# Patient Record
Sex: MALE | Race: WHITE | NOT HISPANIC OR LATINO | Employment: FULL TIME | URBAN - METROPOLITAN AREA
[De-identification: names, ages, dates, MRNs, and addresses within clinical notes are randomized per-mention and may not be internally consistent; named-entity substitution may affect disease eponyms.]

---

## 2019-02-06 ENCOUNTER — TRANSCRIBE ORDERS (OUTPATIENT)
Dept: ADMINISTRATIVE | Age: 54
End: 2019-02-06

## 2019-02-06 ENCOUNTER — APPOINTMENT (OUTPATIENT)
Dept: LAB | Age: 54
End: 2019-02-06
Payer: COMMERCIAL

## 2019-02-06 DIAGNOSIS — E66.01 MORBID OBESITY (HCC): Primary | ICD-10-CM

## 2019-02-06 DIAGNOSIS — Z11.59 SCREENING EXAMINATION FOR POLIOMYELITIS: ICD-10-CM

## 2019-02-06 DIAGNOSIS — E66.01 MORBID OBESITY (HCC): ICD-10-CM

## 2019-02-06 DIAGNOSIS — R35.1 NOCTURIA: ICD-10-CM

## 2019-02-06 DIAGNOSIS — E78.2 MIXED HYPERLIPIDEMIA: ICD-10-CM

## 2019-02-06 DIAGNOSIS — R73.01 IMPAIRED FASTING GLUCOSE: ICD-10-CM

## 2019-02-06 LAB
ALBUMIN SERPL BCP-MCNC: 4 G/DL (ref 3.5–5)
ALP SERPL-CCNC: 47 U/L (ref 46–116)
ALT SERPL W P-5'-P-CCNC: 80 U/L (ref 12–78)
ANION GAP SERPL CALCULATED.3IONS-SCNC: 6 MMOL/L (ref 4–13)
AST SERPL W P-5'-P-CCNC: 36 U/L (ref 5–45)
BILIRUB SERPL-MCNC: 0.79 MG/DL (ref 0.2–1)
BILIRUB UR QL STRIP: NEGATIVE
BUN SERPL-MCNC: 16 MG/DL (ref 5–25)
CALCIUM SERPL-MCNC: 9.2 MG/DL (ref 8.3–10.1)
CHLORIDE SERPL-SCNC: 105 MMOL/L (ref 100–108)
CHOLEST SERPL-MCNC: 234 MG/DL (ref 50–200)
CLARITY UR: NORMAL
CO2 SERPL-SCNC: 27 MMOL/L (ref 21–32)
COLOR UR: YELLOW
CREAT SERPL-MCNC: 0.8 MG/DL (ref 0.6–1.3)
EST. AVERAGE GLUCOSE BLD GHB EST-MCNC: 120 MG/DL
GFR SERPL CREATININE-BSD FRML MDRD: 101 ML/MIN/1.73SQ M
GLUCOSE P FAST SERPL-MCNC: 119 MG/DL (ref 65–99)
GLUCOSE UR STRIP-MCNC: NEGATIVE MG/DL
HBA1C MFR BLD: 5.8 % (ref 4.2–6.3)
HCV AB SER QL: NORMAL
HDLC SERPL-MCNC: 34 MG/DL (ref 40–60)
HGB UR QL STRIP.AUTO: NEGATIVE
KETONES UR STRIP-MCNC: NEGATIVE MG/DL
LDLC SERPL CALC-MCNC: 170 MG/DL (ref 0–100)
LEUKOCYTE ESTERASE UR QL STRIP: NEGATIVE
NITRITE UR QL STRIP: NEGATIVE
NONHDLC SERPL-MCNC: 200 MG/DL
PH UR STRIP.AUTO: 5.5 [PH] (ref 4.5–8)
POTASSIUM SERPL-SCNC: 4.1 MMOL/L (ref 3.5–5.3)
PROT SERPL-MCNC: 7.4 G/DL (ref 6.4–8.2)
PROT UR STRIP-MCNC: NEGATIVE MG/DL
PSA SERPL-MCNC: 0.6 NG/ML (ref 0–4)
SODIUM SERPL-SCNC: 138 MMOL/L (ref 136–145)
SP GR UR STRIP.AUTO: 1.03 (ref 1–1.03)
TRIGL SERPL-MCNC: 149 MG/DL
UROBILINOGEN UR QL STRIP.AUTO: 0.2 E.U./DL

## 2019-02-06 PROCEDURE — 36415 COLL VENOUS BLD VENIPUNCTURE: CPT

## 2019-02-06 PROCEDURE — G0103 PSA SCREENING: HCPCS

## 2019-02-06 PROCEDURE — 86803 HEPATITIS C AB TEST: CPT

## 2019-02-06 PROCEDURE — 80061 LIPID PANEL: CPT

## 2019-02-06 PROCEDURE — 80053 COMPREHEN METABOLIC PANEL: CPT

## 2019-02-06 PROCEDURE — 83036 HEMOGLOBIN GLYCOSYLATED A1C: CPT

## 2019-02-06 PROCEDURE — 81003 URINALYSIS AUTO W/O SCOPE: CPT | Performed by: INTERNAL MEDICINE

## 2019-08-22 ENCOUNTER — HOSPITAL ENCOUNTER (OUTPATIENT)
Dept: RADIOLOGY | Age: 54
Discharge: HOME | End: 2019-08-22
Payer: OTHER MISCELLANEOUS

## 2019-08-22 DIAGNOSIS — M75.100 ROTATOR CUFF TEAR: ICD-10-CM

## 2019-08-22 PROCEDURE — 73221 MRI JOINT UPR EXTREM W/O DYE: CPT

## 2019-10-30 ENCOUNTER — HOSPITAL ENCOUNTER (OUTPATIENT)
Dept: RADIOLOGY | Age: 54
Discharge: HOME | End: 2019-10-30
Payer: COMMERCIAL

## 2019-10-30 DIAGNOSIS — M77.9 TENDONITIS: ICD-10-CM

## 2019-10-30 PROCEDURE — 73721 MRI JNT OF LWR EXTRE W/O DYE: CPT

## 2020-02-25 ENCOUNTER — TRANSCRIBE ORDERS (OUTPATIENT)
Dept: ADMINISTRATIVE | Age: 55
End: 2020-02-25

## 2020-02-25 ENCOUNTER — APPOINTMENT (OUTPATIENT)
Dept: LAB | Age: 55
End: 2020-02-25
Payer: COMMERCIAL

## 2020-02-25 DIAGNOSIS — R73.03 DIABETES MELLITUS, LATENT: Primary | ICD-10-CM

## 2020-02-25 DIAGNOSIS — Z79.1 ENCOUNTER FOR LONG-TERM (CURRENT) USE OF NON-STEROIDAL ANTI-INFLAMMATORIES: ICD-10-CM

## 2020-02-25 DIAGNOSIS — E66.8 CONSTITUTIONAL OBESITY: ICD-10-CM

## 2020-02-25 DIAGNOSIS — R73.03 DIABETES MELLITUS, LATENT: ICD-10-CM

## 2020-02-25 LAB
ALBUMIN SERPL BCP-MCNC: 4 G/DL (ref 3.5–5)
ALP SERPL-CCNC: 45 U/L (ref 46–116)
ALT SERPL W P-5'-P-CCNC: 72 U/L (ref 12–78)
ANION GAP SERPL CALCULATED.3IONS-SCNC: 5 MMOL/L (ref 4–13)
AST SERPL W P-5'-P-CCNC: 34 U/L (ref 5–45)
BASOPHILS # BLD AUTO: 0.03 THOUSANDS/ΜL (ref 0–0.1)
BASOPHILS NFR BLD AUTO: 1 % (ref 0–1)
BILIRUB SERPL-MCNC: 0.67 MG/DL (ref 0.2–1)
BUN SERPL-MCNC: 14 MG/DL (ref 5–25)
CALCIUM SERPL-MCNC: 9.2 MG/DL (ref 8.3–10.1)
CHLORIDE SERPL-SCNC: 109 MMOL/L (ref 100–108)
CO2 SERPL-SCNC: 27 MMOL/L (ref 21–32)
CREAT SERPL-MCNC: 0.85 MG/DL (ref 0.6–1.3)
EOSINOPHIL # BLD AUTO: 0.21 THOUSAND/ΜL (ref 0–0.61)
EOSINOPHIL NFR BLD AUTO: 4 % (ref 0–6)
ERYTHROCYTE [DISTWIDTH] IN BLOOD BY AUTOMATED COUNT: 12.3 % (ref 11.6–15.1)
EST. AVERAGE GLUCOSE BLD GHB EST-MCNC: 120 MG/DL
GFR SERPL CREATININE-BSD FRML MDRD: 98 ML/MIN/1.73SQ M
GLUCOSE P FAST SERPL-MCNC: 132 MG/DL (ref 65–99)
HBA1C MFR BLD: 5.8 %
HCT VFR BLD AUTO: 46.2 % (ref 36.5–49.3)
HGB BLD-MCNC: 15.7 G/DL (ref 12–17)
IMM GRANULOCYTES # BLD AUTO: 0.01 THOUSAND/UL (ref 0–0.2)
IMM GRANULOCYTES NFR BLD AUTO: 0 % (ref 0–2)
LYMPHOCYTES # BLD AUTO: 1.94 THOUSANDS/ΜL (ref 0.6–4.47)
LYMPHOCYTES NFR BLD AUTO: 34 % (ref 14–44)
MCH RBC QN AUTO: 30.6 PG (ref 26.8–34.3)
MCHC RBC AUTO-ENTMCNC: 34 G/DL (ref 31.4–37.4)
MCV RBC AUTO: 90 FL (ref 82–98)
MONOCYTES # BLD AUTO: 0.49 THOUSAND/ΜL (ref 0.17–1.22)
MONOCYTES NFR BLD AUTO: 9 % (ref 4–12)
NEUTROPHILS # BLD AUTO: 2.98 THOUSANDS/ΜL (ref 1.85–7.62)
NEUTS SEG NFR BLD AUTO: 52 % (ref 43–75)
NRBC BLD AUTO-RTO: 0 /100 WBCS
PLATELET # BLD AUTO: 179 THOUSANDS/UL (ref 149–390)
PMV BLD AUTO: 11.1 FL (ref 8.9–12.7)
POTASSIUM SERPL-SCNC: 4.1 MMOL/L (ref 3.5–5.3)
PROT SERPL-MCNC: 7 G/DL (ref 6.4–8.2)
RBC # BLD AUTO: 5.13 MILLION/UL (ref 3.88–5.62)
SODIUM SERPL-SCNC: 141 MMOL/L (ref 136–145)
WBC # BLD AUTO: 5.66 THOUSAND/UL (ref 4.31–10.16)

## 2020-02-25 PROCEDURE — 36415 COLL VENOUS BLD VENIPUNCTURE: CPT

## 2020-02-25 PROCEDURE — 80053 COMPREHEN METABOLIC PANEL: CPT

## 2020-02-25 PROCEDURE — 83036 HEMOGLOBIN GLYCOSYLATED A1C: CPT

## 2020-02-25 PROCEDURE — 85025 COMPLETE CBC W/AUTO DIFF WBC: CPT

## 2020-12-09 DIAGNOSIS — Z20.828 EXPOSURE TO SARS-ASSOCIATED CORONAVIRUS: ICD-10-CM

## 2020-12-09 PROCEDURE — U0003 INFECTIOUS AGENT DETECTION BY NUCLEIC ACID (DNA OR RNA); SEVERE ACUTE RESPIRATORY SYNDROME CORONAVIRUS 2 (SARS-COV-2) (CORONAVIRUS DISEASE [COVID-19]), AMPLIFIED PROBE TECHNIQUE, MAKING USE OF HIGH THROUGHPUT TECHNOLOGIES AS DESCRIBED BY CMS-2020-01-R: HCPCS | Performed by: INTERNAL MEDICINE

## 2020-12-10 LAB — SARS-COV-2 RNA SPEC QL NAA+PROBE: NOT DETECTED

## 2022-04-19 ENCOUNTER — EVALUATION (OUTPATIENT)
Dept: OCCUPATIONAL THERAPY | Age: 57
End: 2022-04-19
Payer: OTHER MISCELLANEOUS

## 2022-04-19 DIAGNOSIS — G56.21 LESION OF RIGHT ULNAR NERVE: Primary | ICD-10-CM

## 2022-04-19 PROCEDURE — 97165 OT EVAL LOW COMPLEX 30 MIN: CPT

## 2022-04-19 PROCEDURE — 97110 THERAPEUTIC EXERCISES: CPT

## 2022-04-19 NOTE — PROGRESS NOTES
OT Evaluation     Today's date: 2022  Patient name: Aleks Herrera  : 1965  MRN: [id number]  Referring provider: Yuliet hSarp MD  Dx:   Encounter Diagnosis     ICD-10-CM    1  Lesion of right ulnar nerve  G56 21                   Assessment  Assessment details: Pt is a 61year old male who presents to skilled OT tx s/p subcutaneous ulnar nerve transposition 22  Pt reported mild pain and demo mild edema and ROM limitation to RUE  Pt demo F+ ROM of elbow- noted in flexed position of -15 degrees, but pt can fully extend to zero  Pt noted with stiffness of wrist, 0-45 degrees flex/ext  Pt c/o localized numbness at/around olecranon; no other n/t   Pt would benefit from skilled OT tx to inc ROM, strength, and functional use of RUE  Impairments: abnormal coordination, abnormal or restricted ROM, activity intolerance, impaired physical strength, lacks appropriate home exercise program and pain with function    Symptom irritability: lowBarriers to therapy: Pt travels for work; will be out of the country for the next 2-3 weeks  Understanding of Dx/Px/POC: good   Prognosis: good    Goals  STGs:  1  Pt will report no pain of RUE  2  Pt will inc ROM of wrist to WFLs  3  Pt will be independent with HEP  LTGs  1  Pt will inc  and MMT strength to [facility]  2  Pt will be independent with scar massage  3  Pt will inc FOTO score  2  Pt will report     Plan  Patient would benefit from: skilled occupational therapy and OT eval  Planned modality interventions: thermotherapy: hydrocollator packs, cryotherapy, TENS and unattended electrical stimulation  Planned therapy interventions: joint mobilization, manual therapy, massage, neuromuscular re-education, patient education, self care, sensory integrative techniques, strengthening, stretching, therapeutic activities, therapeutic exercise, home exercise program and functional ROM exercises  Frequency: 1-2x per month    Duration in visits: 4  Plan of Care beginning date: 2022  Treatment plan discussed with: patient        Subjective Evaluation    History of Present Illness  Date of surgery: 2022  Mechanism of injury: surgery and trauma  Mechanism of injury: Pt is a 61year old male who presents to skilled OT tx s/p subcutaneous ulnar nerve transposition 22          Recurrent probem    Quality of life: good    Pain  Current pain ratin  At best pain ratin  At worst pain ratin  Location: elbow/scar  Quality: burning, radiating and throbbing  Relieving factors: rest, support and change in position  Progression: improved    Treatments  Current treatment: occupational therapy  Patient Goals  Patient goals for therapy: decreased edema, decreased pain, increased motion and increased strength          Objective     Observations     Additional Observation Details   Steri strips still intact on medial side of FA and elbow    Tenderness     Right Elbow   Tenderness in the medial epicondyle, olecranon process and proximal radioulnar joint  Right Wrist/Hand   Tenderness in the medial epicondyle, olecranon process and proximal radioulnar joint  Neurological Testing     Sensation     Wrist/Hand     Right   Intact: light touch, pin prick and sharp/dull discrimination    Additional Neurological Details  SW-3 61+    Active Range of Motion     Right Elbow   Flexion: 130 degrees   Extension: 0 degrees   Forearm supination: 45 degrees   Forearm pronation: 80 degrees     Right Wrist   Wrist flexion: 45 degrees   Wrist extension: 45 degrees   Radial deviation: 20 degrees   Ulnar deviation: 30 degrees     Additional Active Range of Motion Details  Pt has h/o ruptured long head of biceps; difficulty with supination may also be contributed       Strength/Myotome Testing     Left Elbow   Flexion: 5  Extension: 5  Forearm supination: 5  Forearm pronation: 5    Right Elbow   Flexion: 3+  Extension: 3+  Forearm supination: 3-  Forearm pronation: 3    Left Wrist/Hand   Wrist extension: 5  Wrist flexion: 5  Radial deviation: 5  Ulnar deviation: 5    Right Wrist/Hand   Wrist extension: 3+  Wrist flexion: 3+  Radial deviation: 3+  Ulnar deviation: 3+    Tests     Right Elbow   Negative Tinel's sign (cubital tunnel)  Right Wrist/Hand   Negative Phalen's sign and Tinel's sign (medial nerve)       Swelling   Left Elbow Girth Measurements   Joint line: 33 cm    Right Elbow Girth Measurements   Joint line: 37 cm    Left Wrist/Hand   Circumference MCP: 23 cm  Circumference wrist: 20 cm    Right Wrist/Hand   Circumference MCP: 24 cm  Circumference wrist: 21 cm             Precautions: Universal;  HEP- scar massage, A/AROM elbow, FA, wrist, isometric strengthening of elbow and wrist       Manuals 4/19            Edema/scar mgmt             PROM                          KT             Neuro Re-Ed             HEP review             Desensitization                                                                              Ther Ex             Elbow ROM             Wrist ROM:  We/wf             Digit ROM:   KUMAR  TGE             Isometric:  Elbow flex/ext  Wrist flex/exy                                                                 Ther Activity                                       Gait Training                                       Modalities

## 2022-08-16 ENCOUNTER — EVALUATION (OUTPATIENT)
Dept: OCCUPATIONAL THERAPY | Age: 57
End: 2022-08-16
Payer: COMMERCIAL

## 2022-08-16 DIAGNOSIS — G56.21 LESION OF RIGHT ULNAR NERVE: Primary | ICD-10-CM

## 2022-08-16 PROCEDURE — 97167 OT EVAL HIGH COMPLEX 60 MIN: CPT

## 2022-08-16 NOTE — LETTER
2022    Amaya Brar, [address] [address]    Patient: Tammie Pope   YOB: 1965   Date of Visit: 2022     Encounter Diagnosis     ICD-10-CM    1  Lesion of right ulnar nerve  G56 21        Dear Dr Perez New: Thank you for your recent referral of Tammie Pope  Please review the attached evaluation summary from Mayo's recent visit  Please verify that you agree with the plan of care by signing the attached order  If you have any questions or concerns, please do not hesitate to call  I sincerely appreciate the opportunity to share in the care of one of your patients and hope to have another opportunity to work with you in the near future  Sincerely,    Naomi Griffin, OT      Referring Provider:     I certify that I have read the below Plan of Care and certify the need for these services furnished under this plan of treatment while under my care  Amaya Brar MD  [address] [place] [address]  [facility] [place]        OT Evaluation     Today's date: 2022  Patient name: Tammie Pope  : 1965  MRN: [id number]  Referring provider: Juliocesar Baron MD  Dx:   Encounter Diagnosis     ICD-10-CM    1  Lesion of right ulnar nerve  G56 21                   Assessment  Assessment details: Pt is a 60 y/o male who underwent a subcutaneous ulnar nerve transposition 22  Pt presented to skilled OT tx 22 for dec ROM of elbow, scar/edema mgmt, and dec strength  Pt then went out of the country for several weeks for work and then tested positive for [place]  Pt reports being very sick for approx 1 month; and then continuing to feel inc weakness and dec FMC of R hand  Pt reports developing shakes/tremors to R hand  Pt reports inability to use R hand for work or ADL tasks at home 2* tremors and weakness   Pt noted with weakness of FDP/FDS, weakness of Palmar and dorsal interossei, weakness of extension and flexion of wrist (flexion >extension), and FPL of thumb  AIN OK sign mostly intact, weakness and inc IP flexion with Froments sign; no clawing noted but pt reports on occ he sees his SF bent in or clawed but able to fix himself; negative Spulding test  Pt possible display ulnar and median nerve involvement; also has neurology appt for tremors  Impairments: abnormal coordination, abnormal or restricted ROM, activity intolerance, impaired physical strength, lacks appropriate home exercise program and pain with function    Symptom irritability: moderateBarriers to therapy: Work schedule   Understanding of Dx/Px/POC: good   Prognosis: good    Goals  STGs:  1  Pt will report dec pain by 50%  2  Pt will report dec tremors by 25%  3  Pt will inc R  strength + 5 #  4  Pt will inc wrist/digit strength by 1/2 muscle grade  LTGs:  1  Pt will be independent with HEP  2  Pt will inc [facility] by evidence of 5 sec faster 9 hole peg test  3  Pt will report dec tremors by 50%  4  Pt will use RUE in work and ADL tasls  5  Pt will inc FOTO score    Plan  Patient would benefit from: skilled occupational therapy and custom splinting  Planned modality interventions: thermotherapy: hydrocollator packs, electrical stimulation/Russian stimulation, fluidotherapy, ultrasound and unattended electrical stimulation  Planned therapy interventions: manual therapy, ADL training, neuromuscular re-education, orthotic fitting/training, orthotic management and training, patient education, strengthening, stretching, therapeutic exercise, therapeutic training, functional ROM exercises and home exercise program  Frequency: 1-2x per month  Duration in weeks: 8  Treatment plan discussed with: patient        Subjective Evaluation    History of Present Illness  Date of onset: 4/8/2022  Mechanism of injury: surgery and trauma  Mechanism of injury: Pt is a 62 y/o male who underwent a subcutaneous ulnar nerve transposition 4/8/22   Pt presented to skilled OT tx 4/22/22 for dec ROM of elbow, scar/edema mgmt, and dec strength  Pt then went out of the country for several weeks for work and then [place] positive [place]  Pt reports being very sick for approx 1 month; and then continuing to feel inc weakness and dec FMC of R hand  Pt reports developing shakes/tremors to R hand  Pt has f/u with neurology in Oct as well  Not a recurrent problem   Quality of life: poor    Pain  Current pain ratin  At best pain ratin  At worst pain ratin  Location: R elbow and hand  Quality: burning, needle-like and cramping  Relieving factors: rest and support  Progression: worsening    Treatments  Current treatment: occupational therapy  Patient Goals  Patient goals for therapy: increased strength, decreased pain and independence with ADLs/IADLs          Objective     Observations     Right Wrist/Hand   Positive for edema       Additional Observation Details  Mild edema to MPs compared to contralateral side    Neurological Testing     Sensation     Wrist/Hand     Right   Intact: light touch, pin prick and sharp/dull discrimination    Active Range of Motion     Left Wrist   Normal active range of motion    Right Wrist   Normal active range of motion  Wrist flexion: 60 degrees   Wrist extension: 50 degrees     Right Thumb   Flexion     MP: 45    DIP: 40  Palmar Abduction    CMC: 60  Radial Abduction    CMC: 50  Opposition: KJP 10    Right Digits   Flexion   Index     MCP: 70    PIP: 80    DIP: 45  Middle     MCP: 75    PIP: 80    DIP: 45  Ring     MCP: 75    PIP: 75    DIP: 45  Little     MCP: 75    PIP: 80    DIP: 45  Extension   Index     MCP: 0    PIP: 0    DIP: 0  Middle     MCP: 0    PIP: 0    DIP: 0  Ring     MCP: 0    PIP: 0    DIP: 0  Little     MCP: 0    PIP: 0    DIP: 0    Strength/Myotome Testing     Left Wrist/Hand   Wrist extension: 5  Wrist flexion: 5  Radial deviation: 5  Ulnar deviation: 5     (2nd hand position)     Trial 1: 96    Thumb Strength  Key/Lateral Pinch     Trial 1: 30  Tip/Two-Point Pinch     Trial 1: 15  Palmar/Three-Point Pinch     Trial 1: 20    Right Wrist/Hand   Wrist extension: 4  Wrist flexion: 4  Radial deviation: 4  Ulnar deviation: 4     (2nd hand position)     Trial 1: 73 4    Thumb Strength   Key/Lateral Pinch     Trial 1: 20  Tip/Two-Point Pinch     Trial 1: 12  Palmar/Three-Point Pinch     Trial 1: 16    Tests     Right Elbow   Negative Tinel's sign (cubital tunnel)  Right Wrist/Hand   Positive Froment's sign and Phalen's sign  Negative AIN OK sign, Finkelstein's, Tinel's sign (medial nerve) and Tinel's sign (radial tunnel)  Additional Tests Details  Pt noted with weakness of FDP/FDS, Palmar and dorsal interossei, extension and flexion of wrist, and FPL of thumb  AIN OK sign mostly intact, weakness and inc IP flexion with Froments sign; no clawing noted but pt reports on occ he sees his SF bent in or clawed but able to fix himself  Neuro Exam:     Functional outcomes   Left 9 peg hole test: 20 (seconds)  Right 9 hole peg test: 31 (seconds)             Precautions: Universal; AIN involvement/ ulnar and median motor involement  HEP- FDP/FDS isometrics, putty , putty pinch, foam squeeze for intrinsic strengthening        Manuals             UNGs/MNGs             PROM digits                          KT             Neuro Re-Ed             Orthotic mgmt             Fist and holds             Isometrics FDP/FDS                                                                 Ther Ex             Putty  and pinch             Digit ROM             ABD/ADD foam squeeze/strengthening                                                                               Ther Activity             FMC; opposition; translation                          Gait Training                                       Modalities

## 2022-08-17 NOTE — PROGRESS NOTES
OT Evaluation     Today's date: 2022  Patient name: Lizzy Acosta  : 1965  MRN: [id number]  Referring provider: Jo Ann Vines MD  Dx:   Encounter Diagnosis     ICD-10-CM    1  Lesion of right ulnar nerve  G56 21                   Assessment  Assessment details: Pt is a 60 y/o male who underwent a subcutaneous ulnar nerve transposition 22  Pt presented to skilled OT tx 22 for dec ROM of elbow, scar/edema mgmt, and dec strength  Pt then went out of the country for several weeks for work and then tested positive for [place]  Pt reports being very sick for approx 1 month; and then continuing to feel inc weakness and dec FMC of R hand  Pt reports developing shakes/tremors to R hand  Pt reports inability to use R hand for work or ADL tasks at home 2* tremors and weakness  Pt noted with weakness of FDP/FDS, weakness of Palmar and dorsal interossei, weakness of extension and flexion of wrist (flexion >extension), and FPL of thumb  AIN OK sign mostly intact, weakness and inc IP flexion with Froments sign; no clawing noted but pt reports on occ he sees his SF bent in or clawed but able to fix himself; negative Spulding test  Pt possible display ulnar and median nerve involvement; also has neurology appt for tremors  Impairments: abnormal coordination, abnormal or restricted ROM, activity intolerance, impaired physical strength, lacks appropriate home exercise program and pain with function    Symptom irritability: moderateBarriers to therapy: Work schedule   Understanding of Dx/Px/POC: good   Prognosis: good    Goals  STGs:  1  Pt will report dec pain by 50%  2  Pt will report dec tremors by 25%  3  Pt will inc R  strength + 5 #  4  Pt will inc wrist/digit strength by 1/2 muscle grade  LTGs:  1  Pt will be independent with HEP  2  Pt will inc [facility] by evidence of 5 sec faster 9 hole peg test  3  Pt will report dec tremors by 50%  4  Pt will use RUE in work and ADL tasls  5   Pt will inc FOTO score    Plan  Patient would benefit from: skilled occupational therapy and custom splinting  Planned modality interventions: thermotherapy: hydrocollator packs, electrical stimulation/Russian stimulation, fluidotherapy, ultrasound and unattended electrical stimulation  Planned therapy interventions: manual therapy, ADL training, neuromuscular re-education, orthotic fitting/training, orthotic management and training, patient education, strengthening, stretching, therapeutic exercise, therapeutic training, functional ROM exercises and home exercise program  Frequency: 1-2x per month  Duration in weeks: 8  Treatment plan discussed with: patient        Subjective Evaluation    History of Present Illness  Date of onset: 2022  Mechanism of injury: surgery and trauma  Mechanism of injury: Pt is a 60 y/o male who underwent a subcutaneous ulnar nerve transposition 22  Pt presented to skilled OT tx 22 for dec ROM of elbow, scar/edema mgmt, and dec strength  Pt then went out of the country for several weeks for work and then tested positive for [place]  Pt reports being very sick for approx 1 month; and then continuing to feel inc weakness and dec FMC of R hand  Pt reports developing shakes/tremors to R hand  Pt has f/u with neurology in Oct as well  Not a recurrent problem   Quality of life: poor    Pain  Current pain ratin  At best pain ratin  At worst pain ratin  Location: R elbow and hand  Quality: burning, needle-like and cramping  Relieving factors: rest and support  Progression: worsening    Treatments  Current treatment: occupational therapy  Patient Goals  Patient goals for therapy: increased strength, decreased pain and independence with ADLs/IADLs          Objective     Observations     Right Wrist/Hand   Positive for edema       Additional Observation Details  Mild edema to MPs compared to contralateral side    Neurological Testing     Sensation     Wrist/Hand     Right   Intact: light touch, pin prick and sharp/dull discrimination    Active Range of Motion     Left Wrist   Normal active range of motion    Right Wrist   Normal active range of motion  Wrist flexion: 60 degrees   Wrist extension: 50 degrees     Right Thumb   Flexion     MP: 45    DIP: 40  Palmar Abduction    CMC: 60  Radial Abduction    CMC: 50  Opposition: KJP 10    Right Digits   Flexion   Index     MCP: 70    PIP: 80    DIP: 45  Middle     MCP: 75    PIP: 80    DIP: 45  Ring     MCP: 75    PIP: 75    DIP: 45  Little     MCP: 75    PIP: 80    DIP: 45  Extension   Index     MCP: 0    PIP: 0    DIP: 0  Middle     MCP: 0    PIP: 0    DIP: 0  Ring     MCP: 0    PIP: 0    DIP: 0  Little     MCP: 0    PIP: 0    DIP: 0    Strength/Myotome Testing     Left Wrist/Hand   Wrist extension: 5  Wrist flexion: 5  Radial deviation: 5  Ulnar deviation: 5     (2nd hand position)     Trial 1: 96    Thumb Strength  Key/Lateral Pinch     Trial 1: 30  Tip/Two-Point Pinch     Trial 1: 15  Palmar/Three-Point Pinch     Trial 1: 20    Right Wrist/Hand   Wrist extension: 4  Wrist flexion: 4  Radial deviation: 4  Ulnar deviation: 4     (2nd hand position)     Trial 1: 73 4    Thumb Strength   Key/Lateral Pinch     Trial 1: 20  Tip/Two-Point Pinch     Trial 1: 12  Palmar/Three-Point Pinch     Trial 1: 16    Tests     Right Elbow   Negative Tinel's sign (cubital tunnel)  Right Wrist/Hand   Positive Froment's sign and Phalen's sign  Negative AIN OK sign, Finkelstein's, Tinel's sign (medial nerve) and Tinel's sign (radial tunnel)  Additional Tests Details  Pt noted with weakness of FDP/FDS, Palmar and dorsal interossei, extension and flexion of wrist, and FPL of thumb  AIN OK sign mostly intact, weakness and inc IP flexion with Froments sign; no clawing noted but pt reports on occ he sees his SF bent in or clawed but able to fix himself     Neuro Exam:     Functional outcomes   Left 9 peg hole test: 20 (seconds)  Right 9 hole peg test: 31 (seconds)             Precautions: Universal; AIN involvement/ ulnar and median motor involement  HEP- FDP/FDS isometrics, putty , putty pinch, foam squeeze for intrinsic strengthening        Manuals             UNGs/MNGs             PROM digits                          KT             Neuro Re-Ed             Orthotic mgmt             Fist and holds             Isometrics FDP/FDS                                                                 Ther Ex             Putty  and pinch             Digit ROM             ABD/ADD foam squeeze/strengthening                                                                               Ther Activity             FMC; opposition; translation                          Gait Training                                       Modalities

## 2022-08-29 ENCOUNTER — OFFICE VISIT (OUTPATIENT)
Dept: OCCUPATIONAL THERAPY | Age: 57
End: 2022-08-29
Payer: COMMERCIAL

## 2022-08-29 DIAGNOSIS — G56.21 LESION OF RIGHT ULNAR NERVE: Primary | ICD-10-CM

## 2022-08-29 PROCEDURE — 97110 THERAPEUTIC EXERCISES: CPT

## 2022-08-29 PROCEDURE — 97112 NEUROMUSCULAR REEDUCATION: CPT

## 2022-08-29 PROCEDURE — 97140 MANUAL THERAPY 1/> REGIONS: CPT

## 2022-08-29 NOTE — PROGRESS NOTES
Daily Note     Today's date: 2022  Patient name: Tracie Lester  : 1965  MRN: [id number]  Referring provider: Kvng Pfeiffer MD  Dx:   Encounter Diagnosis     ICD-10-CM    1  Lesion of right ulnar nerve  G56 21                   Subjective: "The putty and strengthening has helped "      Objective: See treatment diary below      Assessment: Tolerated treatment well  Patient would benefit from continued OT  Pt noted with increased strength of FDP/FDS and FPL; weakness still noted of FDS/FDP 4th and 5th digit  AIN Ok sign - negative and froment sign -negative today  Pt reports noted inc ability to write at certain times  Mild tremors still evident  Ulnar nerve compression still possible for symptoms  Pt will be referred and evaluated by skilled PT for cervical testings of nerves  Plan: Continue per plan of care  Precautions: Universal; AIN involvement/ ulnar and median motor involement  HEP- FDP/FDS isometrics, putty , putty pinch, foam squeeze for intrinsic strengthening        Manuals             UNGs/MNGs UNGS 5'            PROM digits                          KT CUBT            Neuro Re-Ed             Orthotic mgmt             Fist and holds             Isometrics FDP/FDS 3x10 5 sec                                                                Ther Ex             Putty  and pinch             Digit ROM             ABD/ADD foam squeeze/strengthening  3x10 4th and 5th digit foam and putty                                                                             Ther Activity             FMC; opposition; translation pegboard                         Gait Training                                       Modalities

## 2022-09-19 ENCOUNTER — APPOINTMENT (OUTPATIENT)
Dept: PHYSICAL THERAPY | Age: 57
End: 2022-09-19
Payer: COMMERCIAL

## 2022-09-19 ENCOUNTER — OFFICE VISIT (OUTPATIENT)
Dept: OCCUPATIONAL THERAPY | Age: 57
End: 2022-09-19
Payer: COMMERCIAL

## 2022-09-19 DIAGNOSIS — G56.21 LESION OF RIGHT ULNAR NERVE: Primary | ICD-10-CM

## 2022-09-19 PROCEDURE — 97110 THERAPEUTIC EXERCISES: CPT

## 2022-09-19 PROCEDURE — 97168 OT RE-EVAL EST PLAN CARE: CPT

## 2022-09-19 PROCEDURE — 97140 MANUAL THERAPY 1/> REGIONS: CPT

## 2022-09-19 NOTE — PROGRESS NOTES
OT Re-Evaluation     Today's date: 2022  Patient name: Myrtle Abad  : 1965  MRN: [id number]  Referring provider: Raheel Celestin MD  Dx:   Encounter Diagnosis     ICD-10-CM    1  Lesion of right ulnar nerve  G56 21                   Assessment  Assessment details: 22- Pt has been participating in HEP and attending skilled OT tx appox 2x per month 2* work travel schedule  Pt reports improvement in gross motor and strength but still c/o difficulty with [address] [place], hand writing, and coordination of 4th and 5th digit of the hand  Pt noted with negative reoccurance of symtpoms with Tinels testing, and demo negative ulnar lesion tension test on R when compared to L  Pt continue to demo weakness to FDS/FDP of 4th and 5th but greater weakness and difficulty with movement/coordination of extension of digits and wrist  Occ tremors noted, positional, with shoulder and elbow flexed and elevated  Moderate edema and scar tissue at medial elbow/CUBT which may be affecting his ulnar nerve  Involvement and weakness  Pt has neurologist appt in 2-3 weeks for further assessment, and would benefit from continued skilled OT tx for strengthening and coordination training  Pt is a 62 y/o male who underwent a subcutaneous ulnar nerve transposition 22  Pt presented to skilled OT tx 22 for dec ROM of elbow, scar/edema mgmt, and dec strength  Pt then went out of the country for several weeks for work and then tested positive for [place]  Pt reports being very sick for approx 1 month; and then continuing to feel inc weakness and dec FMC of R hand  Pt reports developing shakes/tremors to R hand  Pt reports inability to use R hand for work or ADL tasks at home 2* tremors and weakness  Pt noted with weakness of FDP/FDS, weakness of Palmar and dorsal interossei, weakness of extension and flexion of wrist (flexion >extension), and FPL of thumb   AIN OK sign mostly intact, weakness and inc IP flexion with Froments sign; no clawing noted but pt reports on occ he sees his SF bent in or clawed but able to fix himself; negative Spulding test  Pt possible display ulnar and median nerve involvement; also has neurology appt for tremors  Impairments: abnormal coordination, abnormal or restricted ROM, activity intolerance, impaired physical strength, lacks appropriate home exercise program and pain with function    Symptom irritability: moderateBarriers to therapy: Work schedule   Understanding of Dx/Px/POC: good   Prognosis: good    Goals  STGs:  1  Pt will report dec pain by 50% progressing  2  Pt will report dec tremors by 25% progressing  3  Pt will inc R  strength + 5 # progressing  4  Pt will inc wrist/digit strength by 1/2 muscle grade progressing  LTGs:  1  Pt will be independent with HEP  2  Pt will inc [facility] by evidence of 5 sec faster 9 hole peg test  3  Pt will report dec tremors by 50%  4  Pt will use RUE in work and ADL tasls  5  Pt will inc FOTO score    Plan  Patient would benefit from: skilled occupational therapy and custom splinting  Planned modality interventions: thermotherapy: hydrocollator packs, electrical stimulation/Russian stimulation, fluidotherapy, ultrasound and unattended electrical stimulation  Planned therapy interventions: manual therapy, ADL training, neuromuscular re-education, orthotic fitting/training, orthotic management and training, patient education, strengthening, stretching, therapeutic exercise, therapeutic training, functional ROM exercises and home exercise program  Frequency: 1-2x per month  Duration in weeks: 8  Treatment plan discussed with: patient        Subjective Evaluation    History of Present Illness  Date of onset: 4/8/2022  Mechanism of injury: surgery and trauma  Mechanism of injury: Pt is a 60 y/o male who underwent a subcutaneous ulnar nerve transposition 4/8/22  Pt presented to skilled OT tx 4/22/22 for dec ROM of elbow, scar/edema mgmt, and dec strength   Pt then went out of the country for several weeks for work and then tested positive for [place]  Pt reports being very sick for approx 1 month; and then continuing to feel inc weakness and dec FMC of R hand  Pt reports developing shakes/tremors to R hand  Pt has f/u with neurology in Oct as well  Not a recurrent problem   Quality of life: poor    Pain  Current pain ratin  At best pain ratin  At worst pain ratin  Location: R elbow and hand  Quality: burning, needle-like and cramping  Relieving factors: rest and support  Progression: worsening    Treatments  Current treatment: occupational therapy  Patient Goals  Patient goals for therapy: increased strength, decreased pain and independence with ADLs/IADLs          Objective     Observations     Right Wrist/Hand   Positive for edema       Additional Observation Details  Mild edema to MPs compared to contralateral side    Neurological Testing     Sensation     Wrist/Hand     Right   Intact: light touch, pin prick and sharp/dull discrimination    Active Range of Motion     Left Wrist   Normal active range of motion    Right Wrist   Normal active range of motion  Wrist flexion: 60 degrees   Wrist extension: 50 degrees     Right Thumb   Flexion     MP: 45    DIP: 40  Palmar Abduction    CMC: 60  Radial Abduction    CMC: 50  Opposition: KJP 10    Right Digits   Flexion   Index     MCP: 70    PIP: 80    DIP: 45  Middle     MCP: 75    PIP: 80    DIP: 45  Ring     MCP: 75    PIP: 75    DIP: 45  Little     MCP: 75    PIP: 80    DIP: 45  Extension   Index     MCP: 0    PIP: 0    DIP: 0  Middle     MCP: 0    PIP: 0    DIP: 0  Ring     MCP: 0    PIP: 0    DIP: 0  Little     MCP: 0    PIP: 0    DIP: 0    Strength/Myotome Testing     Right Elbow   Flexion: 4-  Extension: 4+  Forearm supination: 4  Forearm pronation: 4    Left Wrist/Hand   Wrist extension: 5  Wrist flexion: 5  Radial deviation: 5  Ulnar deviation: 5     (2nd hand position)     Trial 1: 96    Thumb Strength  Key/Lateral Pinch     Trial 1: 30  Tip/Two-Point Pinch     Trial 1: 15  Palmar/Three-Point Pinch     Trial 1: 20    Right Wrist/Hand   Wrist extension: 4  Wrist flexion: 4  Radial deviation: 4  Ulnar deviation: 4     (2nd hand position)     Trial 1: 73 4    Thumb Strength   Key/Lateral Pinch     Trial 1: 20  Tip/Two-Point Pinch     Trial 1: 12  Palmar/Three-Point Pinch     Trial 1: 16    Tests     Right Shoulder   Negative ULTT1  Right Elbow   Negative Tinel's sign (cubital tunnel)  Right Wrist/Hand   Positive Phalen's sign  Negative AIN OK sign, Finkelstein's, Froment's sign, Tinel's sign (medial nerve) and Tinel's sign (radial tunnel)  Additional Tests Details  9/19/22- Pt noted with negative reoccurance of symtpoms with Tinels testing, and demo negative ulnar lesion tension test on R when compared to L  Pt continue to demo weakness to FDS/FDP of 4th and 5th but greater weakness and difficulty with movement/coordination of extension of digits and wrist  Occ tremors noted, positional, with shoulder and elbow flexed and elevated  Moderate edema and scar tissue at medial elbow/CUBT which may be affecting his ulnar nerve  Involvement and weakness  Pt noted with weakness of FDP/FDS, Palmar and dorsal interossei, extension and flexion of wrist, and FPL of thumb  AIN OK sign mostly intact, weakness and inc IP flexion with Froments sign; no clawing noted but pt reports on occ he sees his SF bent in or clawed but able to fix himself  Neuro Exam:     Functional outcomes   Left 9 peg hole test: 20 (seconds)  Right 9 hole peg test: 31 (seconds)             Precautions: Universal; AIN involvement/ ulnar and median motor involement  HEP- FDP/FDS isometrics, putty , putty pinch, foam squeeze for intrinsic strengthening        Manuals 9/19            UNGs/MNGs 8'            PROM digits                          KT perf            Neuro Re-Ed             Orthotic mgmt             Fist and holds Isometrics FDP/FDS                                                                 Ther Ex             Putty  and pinch 4th and 5th             Digit ROM             ABD/ADD foam squeeze/strengthening  2x15            EDC Red and yelow gummy; rubber bands thick            UNGs 5'                                                   Ther Activity             [address]; opposition; translation                          Gait Training                                       Modalities

## 2022-10-24 ENCOUNTER — HOSPITAL ENCOUNTER (OUTPATIENT)
Dept: RADIOLOGY | Age: 57
Discharge: HOME | End: 2022-10-24
Payer: COMMERCIAL

## 2022-10-24 DIAGNOSIS — R53.1 WEAKNESS: ICD-10-CM

## 2022-10-24 PROCEDURE — 70551 MRI BRAIN STEM W/O DYE: CPT

## 2022-10-24 PROCEDURE — 72141 MRI NECK SPINE W/O DYE: CPT

## 2023-02-21 ENCOUNTER — PROBLEM (OUTPATIENT)
Dept: URBAN - METROPOLITAN AREA CLINIC 6 | Facility: CLINIC | Age: 58
End: 2023-02-21

## 2023-02-21 DIAGNOSIS — H10.33: ICD-10-CM

## 2023-02-21 DIAGNOSIS — H02.052: ICD-10-CM

## 2023-02-21 PROCEDURE — 92002 INTRM OPH EXAM NEW PATIENT: CPT

## 2023-02-21 PROCEDURE — 67820 REVISE EYELASHES: CPT

## 2023-02-21 ASSESSMENT — TONOMETRY
OD_IOP_MMHG: 21
OS_IOP_MMHG: 21

## 2023-02-21 ASSESSMENT — VISUAL ACUITY
OD_PH: 20/30
OU_SC: J1
OS_CC: 20/25
OD_CC: 20/40+1

## 2023-03-15 ENCOUNTER — HOSPITAL ENCOUNTER (OUTPATIENT)
Dept: RADIOLOGY | Age: 58
Discharge: HOME | End: 2023-03-15

## 2023-03-15 DIAGNOSIS — M54.50 LOW BACK PAIN: ICD-10-CM

## 2023-03-31 ENCOUNTER — FOLLOW UP (OUTPATIENT)
Dept: URBAN - METROPOLITAN AREA CLINIC 6 | Facility: CLINIC | Age: 58
End: 2023-03-31

## 2023-03-31 DIAGNOSIS — H02.052: ICD-10-CM

## 2023-03-31 DIAGNOSIS — H10.33: ICD-10-CM

## 2023-03-31 PROCEDURE — 67820 REVISE EYELASHES: CPT | Mod: E4

## 2023-03-31 PROCEDURE — 92012 INTRM OPH EXAM EST PATIENT: CPT | Mod: 25

## 2023-03-31 ASSESSMENT — VISUAL ACUITY
OS_CC: 20/20
OD_CC: 20/25

## 2023-06-01 ENCOUNTER — PROBLEM (OUTPATIENT)
Dept: URBAN - METROPOLITAN AREA CLINIC 6 | Facility: CLINIC | Age: 58
End: 2023-06-01

## 2023-06-01 DIAGNOSIS — H04.123: ICD-10-CM

## 2023-06-01 DIAGNOSIS — H01.003: ICD-10-CM

## 2023-06-01 DIAGNOSIS — H01.006: ICD-10-CM

## 2023-06-01 DIAGNOSIS — H02.052: ICD-10-CM

## 2023-06-01 DIAGNOSIS — H25.813: ICD-10-CM

## 2023-06-01 PROCEDURE — 92012 INTRM OPH EXAM EST PATIENT: CPT | Mod: 25

## 2023-06-01 PROCEDURE — 67820 REVISE EYELASHES: CPT

## 2023-06-01 ASSESSMENT — TONOMETRY
OS_IOP_MMHG: 20
OD_IOP_MMHG: 19

## 2023-06-01 ASSESSMENT — VISUAL ACUITY
OD_CC: 20/30
OS_CC: 20/30

## 2023-08-04 ENCOUNTER — ESTABLISHED COMPREHENSIVE EXAM (OUTPATIENT)
Dept: URBAN - METROPOLITAN AREA CLINIC 6 | Facility: CLINIC | Age: 58
End: 2023-08-04

## 2023-08-04 DIAGNOSIS — H01.003: ICD-10-CM

## 2023-08-04 DIAGNOSIS — H04.123: ICD-10-CM

## 2023-08-04 DIAGNOSIS — H25.813: ICD-10-CM

## 2023-08-04 DIAGNOSIS — H01.006: ICD-10-CM

## 2023-08-04 PROCEDURE — 92014 COMPRE OPH EXAM EST PT 1/>: CPT

## 2023-08-04 ASSESSMENT — TONOMETRY
OD_IOP_MMHG: 16
OS_IOP_MMHG: 18

## 2023-08-04 ASSESSMENT — VISUAL ACUITY
OD_CC: 20/30
OS_CC: 20/30+2

## (undated) RX ORDER — TOBRAMYCIN AND DEXAMETHASONE 1; 3 MG/ML; MG/ML: 1 SUSPENSION/ DROPS OPHTHALMIC

## (undated) RX ORDER — LOTEPREDNOL ETABONATE 5 MG/ML: 1 SUSPENSION/ DROPS OPHTHALMIC TWICE A DAY